# Patient Record
Sex: MALE | URBAN - METROPOLITAN AREA
[De-identification: names, ages, dates, MRNs, and addresses within clinical notes are randomized per-mention and may not be internally consistent; named-entity substitution may affect disease eponyms.]

---

## 2021-09-22 ENCOUNTER — INPATIENT (INPATIENT)
Facility: HOSPITAL | Age: 46
LOS: 2 days | Discharge: HOME | DRG: 53 | End: 2021-09-25
Attending: HOSPITALIST | Admitting: INTERNAL MEDICINE
Payer: MEDICAID

## 2021-09-22 VITALS
TEMPERATURE: 98 F | OXYGEN SATURATION: 100 % | SYSTOLIC BLOOD PRESSURE: 100 MMHG | RESPIRATION RATE: 18 BRPM | DIASTOLIC BLOOD PRESSURE: 56 MMHG | HEART RATE: 102 BPM

## 2021-09-22 DIAGNOSIS — Z78.9 OTHER SPECIFIED HEALTH STATUS: ICD-10-CM

## 2021-09-22 LAB
ALBUMIN SERPL ELPH-MCNC: 4.7 G/DL — SIGNIFICANT CHANGE UP (ref 3.3–5)
ALP SERPL-CCNC: 91 U/L — SIGNIFICANT CHANGE UP (ref 40–120)
ALT FLD-CCNC: 61 U/L — SIGNIFICANT CHANGE UP (ref 12–78)
ANION GAP SERPL CALC-SCNC: 7 MMOL/L — SIGNIFICANT CHANGE UP (ref 5–17)
APAP SERPL-MCNC: < 2 UG/ML — SIGNIFICANT CHANGE UP (ref 10–30)
AST SERPL-CCNC: 76 U/L — HIGH (ref 15–37)
BASOPHILS # BLD AUTO: 0.06 K/UL — SIGNIFICANT CHANGE UP (ref 0–0.2)
BASOPHILS NFR BLD AUTO: 0.6 % — SIGNIFICANT CHANGE UP (ref 0–2)
BILIRUB SERPL-MCNC: 0.9 MG/DL — SIGNIFICANT CHANGE UP (ref 0.2–1.2)
BUN SERPL-MCNC: 12 MG/DL — SIGNIFICANT CHANGE UP (ref 7–23)
CALCIUM SERPL-MCNC: 9.8 MG/DL — SIGNIFICANT CHANGE UP (ref 8.5–10.1)
CHLORIDE SERPL-SCNC: 97 MMOL/L — SIGNIFICANT CHANGE UP (ref 96–108)
CO2 SERPL-SCNC: 29 MMOL/L — SIGNIFICANT CHANGE UP (ref 22–31)
CREAT SERPL-MCNC: 0.78 MG/DL — SIGNIFICANT CHANGE UP (ref 0.5–1.3)
EOSINOPHIL # BLD AUTO: 0 K/UL — SIGNIFICANT CHANGE UP (ref 0–0.5)
EOSINOPHIL NFR BLD AUTO: 0 % — SIGNIFICANT CHANGE UP (ref 0–6)
GLUCOSE SERPL-MCNC: 130 MG/DL — HIGH (ref 70–99)
HCT VFR BLD CALC: 40.8 % — SIGNIFICANT CHANGE UP (ref 39–50)
HGB BLD-MCNC: 14 G/DL — SIGNIFICANT CHANGE UP (ref 13–17)
IMM GRANULOCYTES NFR BLD AUTO: 0.5 % — SIGNIFICANT CHANGE UP (ref 0–1.5)
LYMPHOCYTES # BLD AUTO: 0.31 K/UL — LOW (ref 1–3.3)
LYMPHOCYTES # BLD AUTO: 2.9 % — LOW (ref 13–44)
MAGNESIUM SERPL-MCNC: 1.7 MG/DL — SIGNIFICANT CHANGE UP (ref 1.6–2.6)
MCHC RBC-ENTMCNC: 33.1 PG — SIGNIFICANT CHANGE UP (ref 27–34)
MCHC RBC-ENTMCNC: 34.3 GM/DL — SIGNIFICANT CHANGE UP (ref 32–36)
MCV RBC AUTO: 96.5 FL — SIGNIFICANT CHANGE UP (ref 80–100)
MONOCYTES # BLD AUTO: 0.71 K/UL — SIGNIFICANT CHANGE UP (ref 0–0.9)
MONOCYTES NFR BLD AUTO: 6.6 % — SIGNIFICANT CHANGE UP (ref 2–14)
NEUTROPHILS # BLD AUTO: 9.67 K/UL — HIGH (ref 1.8–7.4)
NEUTROPHILS NFR BLD AUTO: 89.4 % — HIGH (ref 43–77)
PHOSPHATE SERPL-MCNC: 2.4 MG/DL — LOW (ref 2.5–4.5)
PLATELET # BLD AUTO: 101 K/UL — LOW (ref 150–400)
POTASSIUM SERPL-MCNC: 3.6 MMOL/L — SIGNIFICANT CHANGE UP (ref 3.5–5.3)
POTASSIUM SERPL-SCNC: 3.6 MMOL/L — SIGNIFICANT CHANGE UP (ref 3.5–5.3)
PROT SERPL-MCNC: 8.4 GM/DL — HIGH (ref 6–8.3)
RBC # BLD: 4.23 M/UL — SIGNIFICANT CHANGE UP (ref 4.2–5.8)
RBC # FLD: 14.7 % — HIGH (ref 10.3–14.5)
SALICYLATES SERPL-MCNC: <1.7 MG/DL — LOW (ref 2.8–20)
SODIUM SERPL-SCNC: 133 MMOL/L — LOW (ref 135–145)
WBC # BLD: 10.8 K/UL — HIGH (ref 3.8–10.5)
WBC # FLD AUTO: 10.8 K/UL — HIGH (ref 3.8–10.5)

## 2021-09-22 PROCEDURE — 86769 SARS-COV-2 COVID-19 ANTIBODY: CPT

## 2021-09-22 PROCEDURE — 83735 ASSAY OF MAGNESIUM: CPT

## 2021-09-22 PROCEDURE — 36415 COLL VENOUS BLD VENIPUNCTURE: CPT

## 2021-09-22 PROCEDURE — 95700 EEG CONT REC W/VID EEG TECH: CPT

## 2021-09-22 PROCEDURE — 84146 ASSAY OF PROLACTIN: CPT

## 2021-09-22 PROCEDURE — 72125 CT NECK SPINE W/O DYE: CPT | Mod: 26,MA

## 2021-09-22 PROCEDURE — 80076 HEPATIC FUNCTION PANEL: CPT

## 2021-09-22 PROCEDURE — 84100 ASSAY OF PHOSPHORUS: CPT

## 2021-09-22 PROCEDURE — 82140 ASSAY OF AMMONIA: CPT

## 2021-09-22 PROCEDURE — 71045 X-RAY EXAM CHEST 1 VIEW: CPT | Mod: 26

## 2021-09-22 PROCEDURE — 80048 BASIC METABOLIC PNL TOTAL CA: CPT

## 2021-09-22 PROCEDURE — 95816 EEG AWAKE AND DROWSY: CPT

## 2021-09-22 PROCEDURE — 70450 CT HEAD/BRAIN W/O DYE: CPT | Mod: 26,MA

## 2021-09-22 PROCEDURE — 99285 EMERGENCY DEPT VISIT HI MDM: CPT

## 2021-09-22 PROCEDURE — 95714 VEEG EA 12-26 HR UNMNTR: CPT

## 2021-09-22 PROCEDURE — 85025 COMPLETE CBC W/AUTO DIFF WBC: CPT

## 2021-09-22 PROCEDURE — 82550 ASSAY OF CK (CPK): CPT

## 2021-09-22 PROCEDURE — 80307 DRUG TEST PRSMV CHEM ANLYZR: CPT

## 2021-09-22 RX ORDER — SODIUM CHLORIDE 9 MG/ML
2000 INJECTION INTRAMUSCULAR; INTRAVENOUS; SUBCUTANEOUS ONCE
Refills: 0 | Status: COMPLETED | OUTPATIENT
Start: 2021-09-22 | End: 2021-09-22

## 2021-09-22 RX ORDER — THIAMINE MONONITRATE (VIT B1) 100 MG
100 TABLET ORAL ONCE
Refills: 0 | Status: COMPLETED | OUTPATIENT
Start: 2021-09-22 | End: 2021-09-22

## 2021-09-22 RX ADMIN — SODIUM CHLORIDE 2000 MILLILITER(S): 9 INJECTION INTRAMUSCULAR; INTRAVENOUS; SUBCUTANEOUS at 20:21

## 2021-09-22 RX ADMIN — Medication 2 MILLIGRAM(S): at 20:32

## 2021-09-22 RX ADMIN — SODIUM CHLORIDE 2000 MILLILITER(S): 9 INJECTION INTRAMUSCULAR; INTRAVENOUS; SUBCUTANEOUS at 19:00

## 2021-09-22 RX ADMIN — Medication 100 MILLIGRAM(S): at 20:33

## 2021-09-22 NOTE — ED PROVIDER NOTE - NS_ ATTENDINGSCRIBEDETAILS _ED_A_ED_FT
Adelaide Iverson MD: The history, relevant review of systems, past medical and surgical history, medical decision making, and physical examination was documented by the scribe in my presence and I attest to the accuracy of the documentation.

## 2021-09-22 NOTE — ED ADULT TRIAGE NOTE - CHIEF COMPLAINT QUOTE
Patient brought in by EMS for fall and possible seizure. Patient is nonverbal at baseline. Friends told EMS that patient fell to the ground and was shaking. calls attempted to former coworkers who did not have medical information and a number from patient's phone. unable to get additional information due to patient nonverbal.

## 2021-09-22 NOTE — ED PROVIDER NOTE - OBJECTIVE STATEMENT
46 year old male with no known PMHx presents to the ED brought in by EMS for fall and possible seizure. Patient is nonverbal at baseline. Friends told EMS that patient fell to the ground from a standing position and was shaking. Per EMS, calls attempted to former coworkers who did not have medical information. Pt did a drinking motion and nodded yes when asked if he had ingested vodka. Pt shook head no when asked if he feels pain anywhere. Unable to get additional information due to patient nonverbal

## 2021-09-22 NOTE — ED PROVIDER NOTE - CLINICAL SUMMARY MEDICAL DECISION MAKING FREE TEXT BOX
46 year old male with Neuro alert activated. 46 year old male presents with fall, questionable seizure-like activity. Neuro alert activated. Pt is tremulous here. Indicated he drank vodka. Will work up for alcohol withdrawal. Labs. Fluids Ativan. Reassess.

## 2021-09-23 LAB
ADD ON TEST-SPECIMEN IN LAB: SIGNIFICANT CHANGE UP
ALBUMIN SERPL ELPH-MCNC: 3.8 G/DL — SIGNIFICANT CHANGE UP (ref 3.3–5)
ALP SERPL-CCNC: 70 U/L — SIGNIFICANT CHANGE UP (ref 40–120)
ALT FLD-CCNC: 52 U/L — SIGNIFICANT CHANGE UP (ref 12–78)
AMMONIA BLD-MCNC: 63 UMOL/L — HIGH (ref 11–32)
ANION GAP SERPL CALC-SCNC: 7 MMOL/L — SIGNIFICANT CHANGE UP (ref 5–17)
AST SERPL-CCNC: 84 U/L — HIGH (ref 15–37)
BILIRUB DIRECT SERPL-MCNC: 0.3 MG/DL — HIGH (ref 0–0.2)
BILIRUB INDIRECT FLD-MCNC: 1.2 MG/DL — HIGH (ref 0.2–1)
BILIRUB SERPL-MCNC: 1.5 MG/DL — HIGH (ref 0.2–1.2)
BUN SERPL-MCNC: 8 MG/DL — SIGNIFICANT CHANGE UP (ref 7–23)
CALCIUM SERPL-MCNC: 9 MG/DL — SIGNIFICANT CHANGE UP (ref 8.5–10.1)
CHLORIDE SERPL-SCNC: 101 MMOL/L — SIGNIFICANT CHANGE UP (ref 96–108)
CK SERPL-CCNC: 1455 U/L — HIGH (ref 26–308)
CO2 SERPL-SCNC: 28 MMOL/L — SIGNIFICANT CHANGE UP (ref 22–31)
COVID-19 SPIKE DOMAIN AB INTERP: POSITIVE
COVID-19 SPIKE DOMAIN ANTIBODY RESULT: >250 U/ML — HIGH
CREAT SERPL-MCNC: 0.54 MG/DL — SIGNIFICANT CHANGE UP (ref 0.5–1.3)
GLUCOSE SERPL-MCNC: 76 MG/DL — SIGNIFICANT CHANGE UP (ref 70–99)
MAGNESIUM SERPL-MCNC: 2.1 MG/DL — SIGNIFICANT CHANGE UP (ref 1.6–2.6)
PCP SPEC-MCNC: SIGNIFICANT CHANGE UP
PHOSPHATE SERPL-MCNC: 2.8 MG/DL — SIGNIFICANT CHANGE UP (ref 2.5–4.5)
POTASSIUM SERPL-MCNC: 3.6 MMOL/L — SIGNIFICANT CHANGE UP (ref 3.5–5.3)
POTASSIUM SERPL-SCNC: 3.6 MMOL/L — SIGNIFICANT CHANGE UP (ref 3.5–5.3)
PROT SERPL-MCNC: 7.5 GM/DL — SIGNIFICANT CHANGE UP (ref 6–8.3)
SARS-COV-2 IGG+IGM SERPL QL IA: >250 U/ML — HIGH
SARS-COV-2 IGG+IGM SERPL QL IA: POSITIVE
SODIUM SERPL-SCNC: 136 MMOL/L — SIGNIFICANT CHANGE UP (ref 135–145)

## 2021-09-23 PROCEDURE — 99223 1ST HOSP IP/OBS HIGH 75: CPT

## 2021-09-23 PROCEDURE — 12345: CPT | Mod: NC

## 2021-09-23 PROCEDURE — 95816 EEG AWAKE AND DROWSY: CPT | Mod: 26

## 2021-09-23 RX ORDER — SODIUM CHLORIDE 9 MG/ML
1000 INJECTION, SOLUTION INTRAVENOUS
Refills: 0 | Status: DISCONTINUED | OUTPATIENT
Start: 2021-09-23 | End: 2021-09-23

## 2021-09-23 RX ORDER — FOLIC ACID 0.8 MG
1 TABLET ORAL DAILY
Refills: 0 | Status: DISCONTINUED | OUTPATIENT
Start: 2021-09-23 | End: 2021-09-25

## 2021-09-23 RX ORDER — SODIUM CHLORIDE 9 MG/ML
1000 INJECTION, SOLUTION INTRAVENOUS
Refills: 0 | Status: DISCONTINUED | OUTPATIENT
Start: 2021-09-23 | End: 2021-09-25

## 2021-09-23 RX ORDER — SODIUM CHLORIDE 9 MG/ML
1000 INJECTION INTRAMUSCULAR; INTRAVENOUS; SUBCUTANEOUS
Refills: 0 | Status: DISCONTINUED | OUTPATIENT
Start: 2021-09-23 | End: 2021-09-23

## 2021-09-23 RX ORDER — THIAMINE MONONITRATE (VIT B1) 100 MG
100 TABLET ORAL DAILY
Refills: 0 | Status: COMPLETED | OUTPATIENT
Start: 2021-09-23 | End: 2021-09-25

## 2021-09-23 RX ADMIN — Medication 2 MILLIGRAM(S): at 13:34

## 2021-09-23 RX ADMIN — Medication 2 MILLIGRAM(S): at 05:18

## 2021-09-23 RX ADMIN — Medication 1 TABLET(S): at 10:59

## 2021-09-23 RX ADMIN — Medication 100 MILLIGRAM(S): at 10:59

## 2021-09-23 RX ADMIN — Medication 2 MILLIGRAM(S): at 11:01

## 2021-09-23 RX ADMIN — Medication 1 MILLIGRAM(S): at 10:59

## 2021-09-23 RX ADMIN — SODIUM CHLORIDE 75 MILLILITER(S): 9 INJECTION, SOLUTION INTRAVENOUS at 16:31

## 2021-09-23 RX ADMIN — Medication 2 MILLIGRAM(S): at 22:34

## 2021-09-23 RX ADMIN — Medication 2 MILLIGRAM(S): at 19:02

## 2021-09-23 RX ADMIN — Medication 2 MILLIGRAM(S): at 17:33

## 2021-09-23 RX ADMIN — Medication 2 MILLIGRAM(S): at 00:35

## 2021-09-23 NOTE — PROVIDER CONTACT NOTE (EICU) - BACKGROUND
45 y/o male with unknown PMH with seizure like activity witnessed by friends and EMS. In ER pt following all commands although not verbal/not speaking.

## 2021-09-23 NOTE — PHARMACOTHERAPY INTERVENTION NOTE - COMMENTS
med history complete, patient currently unable to provide medication history due to mental status, per patients doctor first medhx no medication filled

## 2021-09-23 NOTE — CONSULT NOTE ADULT - ASSESSMENT
45 yo m unknown pmhx admitted with     1. ETOH use  2. Seizures    Recommendations:   -suggest ativan taper, can consider higher taper if needed.   -suggest thiamine, folic acid and mv  -suggest neuro consult  -suggest psych consult   -unsure if patient nonverbal because of a language barrier, ?underlying psych disorder, ?etoh withdrawal.     Patient seen at bedside, vitals/chart/medications reviewed, case discussed with eICU attending Dr. Oleary.  At this time patient does not require ICU admission, recommendations as above. 
46 year old man brought to ED by friends for fall and seizure like activity.  The patient is unable to provide any history and there is no contact information in the chart for any friends or family.  He is non-verbal and there is limited participation in the neurological examination, but no gross focal deficits are noted.  Utox and ETOH levels are unremarkable.  CT head with some sinus inflammation, otherwise no acute pathology.  Will get routine EEG in ED. When admitted to floors can consider 24 hour EEG if unable to obtain more information.  Patient tremulous, unclear if there is withdrawal.  Attempt to obtain more information regarding patient's baseline.  Will follow.
large, pale polyp extending from endocervix, cavity otherwise WNL, uterus mildly enlarged

## 2021-09-23 NOTE — H&P ADULT - NSHPPHYSICALEXAM_GEN_ALL_CORE
Vital Signs Last 24 Hrs  T(C): 37.3 (22 Sep 2021 22:31), Max: 37.3 (22 Sep 2021 22:31)  T(F): 99.1 (22 Sep 2021 22:31), Max: 99.1 (22 Sep 2021 22:31)  HR: 79 (22 Sep 2021 22:31) (79 - 102)  BP: 124/94 (22 Sep 2021 22:31) (100/56 - 124/94)  RR: 18 (22 Sep 2021 22:31) (18 - 18)  SpO2: 100% (22 Sep 2021 22:31) (100% - 100%)

## 2021-09-23 NOTE — H&P ADULT - ASSESSMENT
47 y/o M with no known PMHx was BIBA s/p fall with seizure-like activity witnessed by friends. The patient's "friends" told EMS that the patient reportedly fell to the ground from a standing position and was "shaking". Per EMS, calls were attempted to former coworkers who did not have medical information. Per ED documentation the patient reportedly "did a drinking motion and nodded yes when asked if he had ingested vodka." As noted patient was notably tremulous in the ED and non-verbal. HPI therefore limited due to the patient's diminished mental status.    #Seizures  ~admit to Medicine  ~f/u w/ Neurology in the am  ~patient may likely be in a post-ictal state  ~EEG in the am  ~cont Ativan per CIWA protocol  ~DDx includes withdrawal seizures  ~f/u prolactin  ~f/u CPK    #Alcohol Abuse  ~will initiate CIWA protocol  ~cont. Folate/Thiamine/MVI  ~cont. IV hydration    #Vte ppx  ~IMPROVE Vte Risk Scores 0  ~cont. SCDs for now

## 2021-09-23 NOTE — H&P ADULT - HISTORY OF PRESENT ILLNESS
47 y/o M with no known PMHx was BIBA s/p fall with seizure-like activity witnessed by friends. The patient's "friends" told EMS that the patient reportedly fell to the ground from a standing position and was "shaking". Per EMS, calls were attempted to former coworkers who did not have medical information. Per ED documentation the patient reportedly "did a drinking motion and nodded yes when asked if he had ingested vodka." As noted patient was notably tremulous in the ED and non-verbal. HPI therefore limited due to the patient's diminished mental status.            PMHx/PSHx/FamilyHx/SocialHx; unobtainable due to the patients current medical condition

## 2021-09-23 NOTE — CONSULT NOTE ADULT - SUBJECTIVE AND OBJECTIVE BOX
Patient is a 46y old  Male who presents with a chief complaint of Seizure (23 Sep 2021 05:03)      HPI:  47 y/o M with no known PMHx was BIBA s/p fall with seizure-like activity witnessed by friends.  The patient is not verbalizing at all and cannot provide any history.  The patient's "friends" told EMS that the patient reportedly fell to the ground from a standing position and was "shaking". Per EMS, calls were attempted to former coworkers who did not have medical information. Per ED documentation the patient reportedly "did a drinking motion and nodded yes when asked if he had ingested vodka." As noted patient was notably tremulous in the ED and non-verbal. HPI therefore limited due to the patient's diminished mental status.        PMHx/PSHx/FamilyHx/SocialHx; unobtainable due to the patients current medical condition   (23 Sep 2021 03:31)      PAST MEDICAL & SURGICAL HISTORY:  Unable to obtain    FAMILY HISTORY:  Unable to obtain    Social Hx:  Unknown    MEDICATIONS  (STANDING):  folic acid 1 milliGRAM(s) Oral daily  lactated ringers. 1000 milliLiter(s) (100 mL/Hr) IV Continuous <Continuous>  LORazepam   Injectable   IV Push   LORazepam   Injectable 2 milliGRAM(s) IV Push every 4 hours  multivitamin 1 Tablet(s) Oral daily  thiamine 100 milliGRAM(s) Oral daily       Allergies    No Known Allergies    Intolerances        ROS: Pertinent positives in HPI, all other ROS were reviewed and are negative.      Vital Signs Last 24 Hrs  T(C): 37 (23 Sep 2021 07:00), Max: 37.3 (22 Sep 2021 22:31)  T(F): 98.6 (23 Sep 2021 07:00), Max: 99.1 (22 Sep 2021 22:31)  HR: 75 (23 Sep 2021 07:00) (75 - 102)  BP: 120/66 (23 Sep 2021 07:00) (100/56 - 131/78)  BP(mean): --  RR: 17 (23 Sep 2021 07:00) (17 - 18)  SpO2: 98% (23 Sep 2021 07:00) (98% - 100%)        Constitutional: awake and alert.  HEENT: PERRLA, EOMI,   Neck: Supple. No nuchal rigidity  Respiratory: Breath sounds are clear bilaterally  Cardiovascular: S1 and S2, regular / irregular rhythm  Gastrointestinal: soft, nontender  Extremities:  no edema  Musculoskeletal: no joint swelling/tenderness, no abnormal movements  Skin: No rashes    Neurological exam:  HF: Awake and alert. Makes eye contact. Nods yes intermittently. Follows some commands (not consistently). Not verbalizing  CN: ORION, EOMI, VFF, facial sensation normal, no NLFD, tongue midline, Palate moves equally, SCM equal bilaterally  Motor: Normal tone. Moves all extremities equally  Sens: withdraws throughout. Sensation appears intact  Reflexes: Symmetric and normal . BJ 2+, BR 2+, KJ 2+, AJ 2+, downgoing toes b/l  Coord:  Mild tremors of b/l extremities with extension of arms.   Gait/Balance: Not tested          Labs:   09-22    133<L>  |  97  |  12  ----------------------------<  130<H>  3.6   |  29  |  0.78    Ca    9.8      22 Sep 2021 18:57  Phos  2.4     09-22  Mg     1.7     09-22    TPro  8.4<H>  /  Alb  4.7  /  TBili  0.9  /  DBili  x   /  AST  76<H>  /  ALT  61  /  AlkPhos  91  09-22                              14.0   10.80 )-----------( 101      ( 22 Sep 2021 18:57 )             40.8       Radiology:  CT head 9/22/21:  1)  unremarkable CT study of the brain. No acute infarct or hemorrhagic lesion noted..  2)  inflammatory changes noted in the sinuses predominantly left-sided. Sphenoid mucosal disease and pansinus thickening also noted.    CT cervical spine 9/22/21:  No acute fracture identified.  
47 yo m unknown pmhx biba s/p seizure like activity in front of friends and once with EMS admitted to medicine with etoh withdrawal and seizures.  Head ct negative, labs grossly unremarkable.  ICU consult placed for concern for DTs.  At rest when sleeping patient appears comfortable, not tremulous.  When woken up becomes tremulous, appears nervous and is nonverbal.  Patient seen at bedside, will follow commands ie wiggles toes when pointed at them, shows 2 fingers after I demonstrate etc however will not speak.  Unknown if there is a language barrier, ?possible underlying psych component.        PAST MEDICAL & SURGICAL HISTORY:  Unknown      Allergies  No Known Allergies      FAMILY HISTORY:      Social History:       Review of Systems:  Unable to obtain 2/2 currently nonverbal      Physical Examination:    General: Adult male, lying in bed, appears nervous    HEENT: NC/AT, pupils 3mm, equal, reactive, symmetrical to light.     PULM: Symmetrical thorax expansion upon respiration.  Clear to auscultation bilaterally, no significant sputum production    CVS: Regular rate and rhythm, no murmurs, rubs, or gallops    ABD: Soft, nondistended, nontender, normoactive bowel sounds, no masses    EXT: No edema, nontender    SKIN: Warm and well perfused, no rashes noted.    NEURO: Alert, follows basic commands, nonverbal      Medications:  LORazepam     Tablet 2 milliGRAM(s) Oral every 2 hours PRN  LORazepam   Injectable 2 milliGRAM(s) IV Push every 1 hour PRN  LORazepam   Injectable 2 milliGRAM(s) IV Push once PRN  LORazepam   Injectable   IV Push   LORazepam   Injectable 2 milliGRAM(s) IV Push every 4 hours  folic acid 1 milliGRAM(s) Oral daily  multivitamin 1 Tablet(s) Oral daily  sodium chloride 0.9%. 1000 milliLiter(s) IV Continuous <Continuous>  thiamine 100 milliGRAM(s) Oral daily      ICU Vital Signs Last 24 Hrs  T(C): 37.3 (22 Sep 2021 22:31), Max: 37.3 (22 Sep 2021 22:31)  T(F): 99.1 (22 Sep 2021 22:31), Max: 99.1 (22 Sep 2021 22:31)  HR: 79 (22 Sep 2021 22:31) (79 - 102)  BP: 124/94 (22 Sep 2021 22:31) (100/56 - 124/94)  BP(mean): --  ABP: --  ABP(mean): --  RR: 18 (22 Sep 2021 22:31) (18 - 18)  SpO2: 100% (22 Sep 2021 22:31) (100% - 100%)    Vital Signs Last 24 Hrs  T(C): 37.3 (22 Sep 2021 22:31), Max: 37.3 (22 Sep 2021 22:31)  T(F): 99.1 (22 Sep 2021 22:31), Max: 99.1 (22 Sep 2021 22:31)  HR: 79 (22 Sep 2021 22:31) (79 - 102)  BP: 124/94 (22 Sep 2021 22:31) (100/56 - 124/94)  BP(mean): --  RR: 18 (22 Sep 2021 22:31) (18 - 18)  SpO2: 100% (22 Sep 2021 22:31) (100% - 100%)      LABS:                        14.0   10.80 )-----------( 101      ( 22 Sep 2021 18:57 )             40.8     09-22    133<L>  |  97  |  12  ----------------------------<  130<H>  3.6   |  29  |  0.78    Ca    9.8      22 Sep 2021 18:57  Phos  2.4     09-22  Mg     1.7     09-22    TPro  8.4<H>  /  Alb  4.7  /  TBili  0.9  /  DBili  x   /  AST  76<H>  /  ALT  61  /  AlkPhos  91  09-22      CARDIAC MARKERS ( 22 Sep 2021 18:57 )  x     / x     / 539 U/L / x     / x          RADIOLOGY:   < from: CT Cervical Spine No Cont (09.22.21 @ 19:02) >  EXAM:  CT CERVICAL SPINE                          PROCEDURE DATE:  09/22/2021      INTERPRETATION:  CT CERVICAL    INDICATIONS:  neck pain. Trauma.    TECHNIQUE:  Thin section CT imaging was conducted.  3-D, Coronal and sagittal reformations were generated from the axial data.    FINDINGS:    There is alteration of the cervical lordosis which may reflect positioning or spasm.    C1/C2:  The anterior and posterior arches of C1 appear to be intact. There is no C1-C2 subluxation. No odontoidfracture is noted. Base of C2 appears to be intact    The mid and lower cervical vertebral bodies appear to be intact. No upper thoracic fracture is noted.    Mild degenerative changes are noted in the mid and lower cervical region without significant stenosis    Lung apices are clear with no gross pneumothorax.    IMPRESSION:    No acute fracture identified.    --- End of Report ---    DEBORAH LOMELI MD; Attending Radiologist  This document has been electronically signed. Sep 22 2021  6:58PM    < end of copied text >      SUPPLEMENTAL O2: RA  LINES: Peripheral   IVF: NS  GONZALEZ: N   PPx:   CONTACT:

## 2021-09-23 NOTE — PROVIDER CONTACT NOTE (EICU) - RECOMMENDATIONS
Agree with ativan po taper to prevent DTs  MVI/thiamine/folate  Neurology consult  Pt may be admitted to the medical floor.  Discussed with ICU PA.

## 2021-09-23 NOTE — PROGRESS NOTE ADULT - ASSESSMENT
46 year old man brought to ED by friends for fall and seizure like activity. The patient is unable to provide any history. Unable to reach family for collateral history. ? Daughter [phone]. He is non-verbal and there is limited participation in the neurological examination, but no gross focal deficits are noted. Utox and ETOH levels are unremarkable. CT head with some sinus inflammation, otherwise no acute pathology. EEG normal study in the awake and drowsy states with the exception of excess beta activity. Excess beta activity is likely a medication effect which can be seen with benzodiazepine and barbiturate use. No epileptiform activity was seen and no clinical events or seizures were recorded.     Possible seizure, acute encephalopathy, delirium  Etiology unclear. Possibly was an alcohol withdrawal seizure. ETOH level neg. Utox neg. On Ativan for tremulousness, confusion, psychomotor agitation. Will continue to try to reach out to family for collatoral history. Thiamine, Folate, MVI, IVFs with D5 1/2 NS.

## 2021-09-24 VITALS
SYSTOLIC BLOOD PRESSURE: 120 MMHG | OXYGEN SATURATION: 98 % | RESPIRATION RATE: 18 BRPM | TEMPERATURE: 98 F | DIASTOLIC BLOOD PRESSURE: 84 MMHG | HEART RATE: 78 BPM

## 2021-09-24 LAB
ANION GAP SERPL CALC-SCNC: 5 MMOL/L — SIGNIFICANT CHANGE UP (ref 5–17)
BASOPHILS # BLD AUTO: 0.03 K/UL — SIGNIFICANT CHANGE UP (ref 0–0.2)
BASOPHILS NFR BLD AUTO: 0.8 % — SIGNIFICANT CHANGE UP (ref 0–2)
BUN SERPL-MCNC: 8 MG/DL — SIGNIFICANT CHANGE UP (ref 7–23)
CALCIUM SERPL-MCNC: 9.1 MG/DL — SIGNIFICANT CHANGE UP (ref 8.5–10.1)
CHLORIDE SERPL-SCNC: 104 MMOL/L — SIGNIFICANT CHANGE UP (ref 96–108)
CO2 SERPL-SCNC: 30 MMOL/L — SIGNIFICANT CHANGE UP (ref 22–31)
CREAT SERPL-MCNC: 0.63 MG/DL — SIGNIFICANT CHANGE UP (ref 0.5–1.3)
EOSINOPHIL # BLD AUTO: 0.13 K/UL — SIGNIFICANT CHANGE UP (ref 0–0.5)
EOSINOPHIL NFR BLD AUTO: 3.5 % — SIGNIFICANT CHANGE UP (ref 0–6)
GLUCOSE SERPL-MCNC: 109 MG/DL — HIGH (ref 70–99)
HCT VFR BLD CALC: 41.9 % — SIGNIFICANT CHANGE UP (ref 39–50)
HGB BLD-MCNC: 14.2 G/DL — SIGNIFICANT CHANGE UP (ref 13–17)
IMM GRANULOCYTES NFR BLD AUTO: 0.3 % — SIGNIFICANT CHANGE UP (ref 0–1.5)
LYMPHOCYTES # BLD AUTO: 0.94 K/UL — LOW (ref 1–3.3)
LYMPHOCYTES # BLD AUTO: 25.6 % — SIGNIFICANT CHANGE UP (ref 13–44)
MAGNESIUM SERPL-MCNC: 2.1 MG/DL — SIGNIFICANT CHANGE UP (ref 1.6–2.6)
MANUAL SMEAR VERIFICATION: SIGNIFICANT CHANGE UP
MCHC RBC-ENTMCNC: 33.4 PG — SIGNIFICANT CHANGE UP (ref 27–34)
MCHC RBC-ENTMCNC: 33.9 GM/DL — SIGNIFICANT CHANGE UP (ref 32–36)
MCV RBC AUTO: 98.6 FL — SIGNIFICANT CHANGE UP (ref 80–100)
MONOCYTES # BLD AUTO: 0.53 K/UL — SIGNIFICANT CHANGE UP (ref 0–0.9)
MONOCYTES NFR BLD AUTO: 14.4 % — HIGH (ref 2–14)
NEUTROPHILS # BLD AUTO: 2.03 K/UL — SIGNIFICANT CHANGE UP (ref 1.8–7.4)
NEUTROPHILS NFR BLD AUTO: 55.4 % — SIGNIFICANT CHANGE UP (ref 43–77)
PHOSPHATE SERPL-MCNC: 2.9 MG/DL — SIGNIFICANT CHANGE UP (ref 2.5–4.5)
PLAT MORPH BLD: NORMAL — SIGNIFICANT CHANGE UP
PLATELET # BLD AUTO: 90 K/UL — LOW (ref 150–400)
POTASSIUM SERPL-MCNC: 3.2 MMOL/L — LOW (ref 3.5–5.3)
POTASSIUM SERPL-SCNC: 3.2 MMOL/L — LOW (ref 3.5–5.3)
PROLACTIN SERPL-MCNC: 8.6 NG/ML — SIGNIFICANT CHANGE UP (ref 4.1–18.4)
RBC # BLD: 4.25 M/UL — SIGNIFICANT CHANGE UP (ref 4.2–5.8)
RBC # FLD: 14.4 % — SIGNIFICANT CHANGE UP (ref 10.3–14.5)
RBC BLD AUTO: NORMAL — SIGNIFICANT CHANGE UP
SODIUM SERPL-SCNC: 139 MMOL/L — SIGNIFICANT CHANGE UP (ref 135–145)
WBC # BLD: 3.67 K/UL — LOW (ref 3.8–10.5)
WBC # FLD AUTO: 3.67 K/UL — LOW (ref 3.8–10.5)

## 2021-09-24 PROCEDURE — 99233 SBSQ HOSP IP/OBS HIGH 50: CPT

## 2021-09-24 PROCEDURE — 95720 EEG PHY/QHP EA INCR W/VEEG: CPT

## 2021-09-24 PROCEDURE — 99232 SBSQ HOSP IP/OBS MODERATE 35: CPT

## 2021-09-24 RX ORDER — POTASSIUM CHLORIDE 20 MEQ
40 PACKET (EA) ORAL ONCE
Refills: 0 | Status: COMPLETED | OUTPATIENT
Start: 2021-09-24 | End: 2021-09-24

## 2021-09-24 RX ADMIN — Medication 1.5 MILLIGRAM(S): at 02:00

## 2021-09-24 RX ADMIN — SODIUM CHLORIDE 75 MILLILITER(S): 9 INJECTION, SOLUTION INTRAVENOUS at 15:34

## 2021-09-24 RX ADMIN — Medication 1 TABLET(S): at 10:59

## 2021-09-24 RX ADMIN — Medication 100 MILLIGRAM(S): at 10:59

## 2021-09-24 RX ADMIN — Medication 1.5 MILLIGRAM(S): at 11:00

## 2021-09-24 RX ADMIN — SODIUM CHLORIDE 75 MILLILITER(S): 9 INJECTION, SOLUTION INTRAVENOUS at 02:00

## 2021-09-24 RX ADMIN — Medication 40 MILLIEQUIVALENT(S): at 15:53

## 2021-09-24 RX ADMIN — Medication 1.5 MILLIGRAM(S): at 05:41

## 2021-09-24 RX ADMIN — Medication 1 MILLIGRAM(S): at 10:59

## 2021-09-24 NOTE — PROGRESS NOTE ADULT - ASSESSMENT
46 year old man brought to ED by friends for fall and seizure like activity.  Limited history available.  I attempted to contact "Lenin" ([phone]) who's number is in his phone but there was no answer.    24 hour EEG does not show evidence of epileptiform activity.  Unable to obtain any history regarding the event that brought him here (patient nods head "no" when asked if he drinks alcohol) or any underlying risk for seizures.  At this time would hold off on anticonvulsants.  If information is obtained suggesting underlying risk for seizures, can start Keppra.    Dr. Langley will take over neurology service on 9/25/21. Please call if needed.

## 2021-09-24 NOTE — PROGRESS NOTE ADULT - ASSESSMENT
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 The was unable to provide any history and friends and family were not able to be reached for collateral. In the ED, patient was unable to communicate. He was a bit drowsy as well, possibly post ictal. He had no focal deficits on neuro exam. Labs were notable for mild leukocytosis, possibly reactive, Na 133, K WNL, glucose 130, BUN, Cr WNL. LFTs WNL aside from mild AST/ALT elevation. CPK 1455. Ammonia 63. Urine drug screen negative. Acetaminophen < 2.0. Salicylate < 1.7. Alcohol < 10. CT head was negative aside from some mild sinus inflammation. Neurology was consulted and patient was admitted to Medicine.     Possible seizure, acute encephalopathy, delirium  Delirium appears to be resolving or resolved. Today, 9/24, spoke with patient's uncle who provided collateral history that patient has had deafness and mutism since birth, otherwise has no medical conditions that they know of and is on no medications. He follows with PCP Dr. Ramirez in [place]. He is unemployed and financially supported by family. He drinks alcohol occasionally and not to a pathologic degree per uncle, though patient's thrombocytopenia and mild AST/ALT elevation may suggest otherwise. No known drug use. Patient overall appears well today and is likely at his baseline. He is able to converse minimally with head nods, writes a bit on paper, can hear a very little bit and read lips minimally. He does not appear to have any active symptoms. No further seizure activity since admission. CT head negative. EEG negative for seizure activity. Appreciate input from Neurology. For MRI brain WWO today to complete workup. Anticipating patient discharge tomorrow to home, uncle to pick him up at the hospital. Continue empiric thiamine, folic acid, MVI.     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 The was unable to provide any history and friends and family were not able to be reached for collateral. In the ED, patient was unable to communicate. He was a bit drowsy as well, possibly post ictal. He had no focal deficits on neuro exam. Labs were notable for mild leukocytosis, possibly reactive, Na 133, K WNL, glucose 130, BUN, Cr WNL. LFTs WNL aside from mild AST/ALT elevation. CPK 1455. Ammonia 63. Urine drug screen negative. Acetaminophen < 2.0. Salicylate < 1.7. Alcohol < 10. CT head was negative aside from some mild sinus inflammation. Neurology was consulted and patient was admitted to Medicine.     Likely seizure, acute encephalopathy, delirium  Delirium and encephalopathy appear to be resolving or resolved. Today, 9/24, spoke with patient's uncle who provided collateral history that patient has had deafness and mutism since birth, otherwise has no medical conditions that they know of and is on no medications. He follows with PCP Dr. Ramirez in [place]. He is unemployed and financially supported by family. He drinks alcohol occasionally and not to a pathologic degree per uncle, though patient's thrombocytopenia and mild AST/ALT elevation may suggest otherwise. No known drug use. Patient overall appears well today and is likely at his baseline. He is able to converse minimally with head nods, writes a bit on paper, can hear a very little bit and read lips minimally. He does not appear to have any active symptoms. No further seizure activity since admission. CT head negative. EEG negative for seizure activity. Appreciate input from Neurology. For MRI brain WWO today to complete workup. Anticipating patient discharge tomorrow to home, uncle to pick him up at the hospital.     Possible alcohol use disorder  As evidenced by his mild thrombocytopenia, mild AST/ALT elevation. And presentation for likely seizure. Continue empiric thiamine, folic acid, MVI.     Diet: Regular  DVT px: Ambulatory  Code status: Full  Emergency contact: Vu (uncle) [phone]  Dispo: Anticipate DC home tomorrow with patient's uncle. Outpatient follow up with PCP Dr. J Carlos Ramirez, [address], [facility] [address]. [phone].

## 2021-09-24 NOTE — PROGRESS NOTE ADULT - SUBJECTIVE AND OBJECTIVE BOX
Chief complaint: Seizure    Interval history: Subjective assessment limited by patient's mental status, unable to verbalize complaints though following some simple commands. Has intermittently received Ativan for perceived alcohol withdrawal. Unable to reach family for collateral history. Patient remains seemingly confused, bit impulsive, tremulous.     ROS: Multisystem review is very limited by his delirium    Vitals:  T(F): 98.4 (23 Sep 2021 17:29), Max: 99.1 (22 Sep 2021 22:31)  HR: 89 (23 Sep 2021 17:29) (59 - 89)  BP: 116/85 (23 Sep 2021 17:29) (116/85 - 131/78)  BP(mean): 84 (23 Sep 2021 10:22) (84 - 84)  RR: 18 (23 Sep 2021 17:29) (17 - 18)  SpO2: 97% (23 Sep 2021 17:29) (96% - 100%)    Exam:  Gen: No acute distress  HEENT: NCAT PERRL moist oral mucosa  Neck: Supple, no JVD  Chest: CTA B/L  CVS: S1 S2 normal RRR  Abd: +BS, soft NT ND   Ext: No edema or calf tenderness  Neuro: Awake, confused, not verbalizing, unclear if post ictal, delirious etc, no focal deficits though    Labs:                      14.0   10.80 )-----------( 101                   40.8     136  |  101  |  8   ----------------------------<  76  3.6   |  28  |  0.54    Ca    9.0        Phos  2.8       Mg     2.1         TPro  7.5  /  Alb  3.8  /  TBili  1.5<H>  /  DBili  0.3<H>  /  AST  84<H>  /  ALT  52  /  AlkPhos  70  09-23    CPK 1455  Ammonia 63    Imaging:  CT head WO 9/22: Unremarkable CT study of the brain. No acute infarct or hemorrhagic lesion noted. Inflammatory changes noted in the sinuses predominantly left-sided. Sphenoid mucosal disease and pansinus thickening also noted.    CT C spine WO 9/22: There is alteration of the cervical lordosis which may reflect positioning or spasm. The anterior and posterior arches of C1 appear to be intact. There is no C1-C2 subluxation. No odontoid fracture is noted. Base of C2 appears to be intact. The mid and lower cervical vertebral bodies appear to be intact. No upper thoracic fracture is noted. Mild degenerative changes are noted in the mid and lower cervical region without significant stenosis    Micro:  COVIDPCR 9/22: Negative    Additional Testing:  EEG 9/23: Normal EEG study in the awake and drowsy states with the exception of excess beta activity. Excess beta activity is likely a medication effect which can be seen with benzodiazepine and barbiturate use. No epileptiform activity was seen and no clinical events or seizures were recorded.                                   
Chief complaint: Seizure    Interval history: Subjective assessment limited by patient's deafness and mutism. Patient overall appears well. He is able to converse minimally with head nods, writes a bit on paper, can hear a very little bit and read lips minimally. He does not appear to have any active symptoms. No further seizure activity since admission. Spoke with patient's uncle Vu, with whom patient lives and who takes are of patient. Patient has had this condition since birth but otherwise is not known to have any medical conditions and is not on any medications. He is not employed. He is financially supported by family. He drinks alcohol occasionally and not to a pathologic degree per uncle, though patient's thrombocytopenia and mild AST/ALT elevation may suggest otherwise. No known drug use. Note EEG negative for seizure activity. Appreciate input from Neurology. For MRI brain WWO today to complete workup. Anticipating patient discharge tomorrow to home, uncle to pick him up at the hospital.     ROS: Multisystem review is limited by patient's deafness and mutism but appears otherwise comprehensively negative x 10 systems    Vitals:  T(C): 36.8 (24 Sep 2021 08:08), Max: 37.2 (23 Sep 2021 15:41)  T(F): 98.3 (24 Sep 2021 08:08), Max: 98.9 (23 Sep 2021 15:41)  HR: 98 (24 Sep 2021 08:08) (73 - 98)  BP: 122/91 (24 Sep 2021 08:08) (116/85 - 127/95)  RR: 19 (24 Sep 2021 08:08) (18 - 19)  SpO2: 100% (24 Sep 2021 08:08) (96% - 100%)    Exam:  Gen: No acute distress  HEENT: NCAT PERRL moist oral mucosa  Neck: Supple, no JVD  Chest: CTA B/L  CVS: S1 S2 normal RRR  Abd: +BS, soft NT ND   Ext: No edema or calf tenderness  Neuro: Awake, alert. Follows simple commands, deaf, mute. Strength full 5/5 throughout. Sensation intact. No drift. Plantars downgoing. Able to heal to shin well. Steady gait.     Labs:                      14.2   3.67 )-----------( 90                   41.9     139  |  104  |  8   ----------------------------<  109  3.2   |  30  |  0.63    Ca    9.1        Phos  2.9       Mg     2.1         TPro  7.2  /  Alb  3.7  /  TBili  1.3<H>  /  DBili  0.3<H>  /  AST  68<H>  /  ALT  47  /  AlkPhos  71    CPK 1455 --> 876  Ammonia 63  Urine drug screen negative  Acetaminophen < 2.0  Salicylate < 1.7  Alcohol < 10    Imaging:  MRI head WWO requested    CT head WO 9/22: Unremarkable CT study of the brain. No acute infarct or hemorrhagic lesion noted. Inflammatory changes noted in the sinuses predominantly left-sided. Sphenoid mucosal disease and pansinus thickening also noted.    CT C spine WO 9/22: There is alteration of the cervical lordosis which may reflect positioning or spasm. The anterior and posterior arches of C1 appear to be intact. There is no C1-C2 subluxation. No odontoid fracture is noted. Base of C2 appears to be intact. The mid and lower cervical vertebral bodies appear to be intact. No upper thoracic fracture is noted. Mild degenerative changes are noted in the mid and lower cervical region without significant stenosis    Micro:  COVID PCR 9/22: Negative    Additional Testing:  EEG 9/24: Unmonitored 12-26 hour EEG done. Normal EEG in the awake, drowsy and asleep states with the exception of excess beta activity. No epileptiform activity is seen. Excess beta activity is likely a medication effect which can be seen in the setting of benzodiazepine and barbiturate use    EEG 9/23: Normal EEG study in the awake and drowsy states with the exception of excess beta activity. Excess beta activity is likely a medication effect which can be seen with benzodiazepine and barbiturate use. No epileptiform activity was seen and no clinical events or seizures were recorded.                         
Interval History:  9/24/21: Patient nonverbal but able to nod "yes" and "no" and use gestures.    MEDICATIONS  (STANDING):  dextrose 5% + sodium chloride 0.45%. 1000 milliLiter(s) (75 mL/Hr) IV Continuous <Continuous>  folic acid 1 milliGRAM(s) Oral daily  LORazepam   Injectable   IV Push   LORazepam   Injectable 1.5 milliGRAM(s) IV Push every 4 hours  multivitamin 1 Tablet(s) Oral daily  potassium chloride   Powder 40 milliEquivalent(s) Oral once  thiamine 100 milliGRAM(s) Oral daily    MEDICATIONS  (PRN):  LORazepam     Tablet 2 milliGRAM(s) Oral every 2 hours PRN Symptom-triggered 2 point increase in CIWA-Ar  LORazepam   Injectable 2 milliGRAM(s) IV Push every 1 hour PRN Symptom-triggered: each CIWA -Ar score 8 or GREATER  LORazepam   Injectable 2 milliGRAM(s) IV Push once PRN To control seizure activity  LORazepam   Injectable 2 milliGRAM(s) IV Push every 2 hours PRN Agitation      Allergies    No Known Allergies    Intolerances        PHYSICAL EXAM:  Vital Signs Last 24 Hrs  T(F): 98.3 (09-24-21 @ 08:08)  HR: 98 (09-24-21 @ 08:08)  BP: 122/91 (09-24-21 @ 08:08)  RR: 19 (09-24-21 @ 08:08)    GENERAL: NAD, well-groomed, well-developed  HEAD:  Atraumatic, Normocephalic  Neuro:  Awake, alert, non-verbal  Following commands  CN: PERRL, EOMI, no nystagmus, no facial weakness, tongue protrudes in the midline  motor: no focal weakness  sensory: intact to light touch  coordination: finger to nose intact bilaterally    LABS:                        14.2   3.67  )-----------( 90       ( 24 Sep 2021 05:36 )             41.9     09-24    139  |  104  |  8   ----------------------------<  109<H>  3.2<L>   |  30  |  0.63    Ca    9.1      24 Sep 2021 05:36  Phos  2.9     09-24  Mg     2.1     09-24    TPro  7.2  /  Alb  3.7  /  TBili  1.3<H>  /  DBili  0.3<H>  /  AST  68<H>  /  ALT  47  /  AlkPhos  71  09-24          RADIOLOGY & ADDITIONAL STUDIES:  CT head 9/22/21:  1)  unremarkable CT study of the brain. No acute infarct or hemorrhagic lesion noted..  2)  inflammatory changes noted in the sinuses predominantly left-sided. Sphenoid mucosal disease and pansinus thickening also noted.    CT cervical spine 9/22/21:  No acute fracture identified.    EEG 9/23/21: normal with exception of excess beta activity  24 hour EEG 9/23/21-9/24/21: normal with exception of excess beta activity

## 2021-09-25 PROCEDURE — 99239 HOSP IP/OBS DSCHRG MGMT >30: CPT
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
## 2021-09-25 NOTE — DISCHARGE NOTE PROVIDER - NSDCCPCAREPLAN_GEN_ALL_CORE_FT
PRINCIPAL DISCHARGE DIAGNOSIS  Diagnosis: Seizure  Assessment and Plan of Treatment: Resolved. Current at baseline. Has deafness and mutism since birth. No active symptoms. No further seizure activity since admission. CT head negative. EEG negative for seizure activity. Appreciate input from Neurology. Suggested MRI brain WWO to evaluate for any structural lesion or finding that could increase risk for seizure and thus add to case for starting AED. However, patient refusing study, would not participate and was difficult to redirect. Would need to have exam under anesthesia. Since this a first time possible seizure, electing instead for patient to follow up with his PCP and Neurology outpatient. No need for AED upon discharge. Advised no driving. No operating of heavy machinery.      SECONDARY DISCHARGE DIAGNOSES  Diagnosis: Abnormal liver function test  Assessment and Plan of Treatment: Asymptomatic. Mild AST ALT elevation. Concern for possible alcohol use disorder. Patient to follow up with PCP. Patient's emergency contact / next of kin aware, shares the same PCP.

## 2021-09-25 NOTE — DISCHARGE NOTE PROVIDER - CARE PROVIDERS DIRECT ADDRESSES
,DirectAddress_Unknown,[email or web address]

## 2021-09-25 NOTE — DISCHARGE NOTE PROVIDER - CARE PROVIDER_API CALL
J Carlos Ramirez  CARDIOLOGY  [address], [address]  [place] [address]  Phone: [phone]  Fax: [phone]  Follow Up Time: 1 week    Sandrine Corona)  Clinical Neurophysiology; EEGEpilepsy; Neurology; Sleep Medicine  [address] [place] [address], [place] [address]  Phone: [phone]  Fax: [phone]  Follow Up Time: 2 weeks

## 2021-09-25 NOTE — DISCHARGE NOTE NURSING/CASE MANAGEMENT/SOCIAL WORK - PATIENT PORTAL LINK FT
You can access the FollowMyHealth Patient Portal offered by [facility] by registering at the following website: [email or web address]. By joining [employer]’s FollowMyHealth portal, you will also be able to view your health information using other applications (apps) compatible with our system.

## 2021-09-25 NOTE — DISCHARGE NOTE PROVIDER - PROVIDER TOKENS
PROVIDER:[TOKEN:[[id number]],FOLLOWUP:[1 week]],PROVIDER:[TOKEN:[[id number]],FOLLOWUP:[2 weeks]]

## 2021-09-29 DIAGNOSIS — R94.5 ABNORMAL RESULTS OF LIVER FUNCTION STUDIES: ICD-10-CM

## 2021-09-29 DIAGNOSIS — F10.10 ALCOHOL ABUSE, UNCOMPLICATED: ICD-10-CM

## 2021-09-29 DIAGNOSIS — Z56.0 UNEMPLOYMENT, UNSPECIFIED: ICD-10-CM

## 2021-09-29 DIAGNOSIS — Z20.822 CONTACT WITH AND (SUSPECTED) EXPOSURE TO COVID-19: ICD-10-CM

## 2021-09-29 DIAGNOSIS — R55 SYNCOPE AND COLLAPSE: ICD-10-CM

## 2021-09-29 DIAGNOSIS — R56.9 UNSPECIFIED CONVULSIONS: ICD-10-CM

## 2021-09-29 DIAGNOSIS — D69.6 THROMBOCYTOPENIA, UNSPECIFIED: ICD-10-CM

## 2021-09-29 DIAGNOSIS — H91.3 DEAF NONSPEAKING, NOT ELSEWHERE CLASSIFIED: ICD-10-CM

## 2021-09-29 SDOH — ECONOMIC STABILITY - INCOME SECURITY: UNEMPLOYMENT, UNSPECIFIED: Z56.0

## 2023-02-11 ENCOUNTER — EMERGENCY (EMERGENCY)
Facility: HOSPITAL | Age: 48
LOS: 1 days | Discharge: HOME | End: 2023-02-11
Attending: EMERGENCY MEDICINE | Admitting: EMERGENCY MEDICINE
Payer: MEDICAID

## 2023-02-11 VITALS
RESPIRATION RATE: 16 BRPM | TEMPERATURE: 98 F | DIASTOLIC BLOOD PRESSURE: 97 MMHG | SYSTOLIC BLOOD PRESSURE: 129 MMHG | OXYGEN SATURATION: 100 % | HEART RATE: 100 BPM

## 2023-02-11 VITALS
RESPIRATION RATE: 16 BRPM | OXYGEN SATURATION: 99 % | HEART RATE: 76 BPM | TEMPERATURE: 98 F | DIASTOLIC BLOOD PRESSURE: 89 MMHG | SYSTOLIC BLOOD PRESSURE: 121 MMHG

## 2023-02-11 PROCEDURE — 99284 EMERGENCY DEPT VISIT MOD MDM: CPT

## 2023-02-11 PROCEDURE — 70486 CT MAXILLOFACIAL W/O DYE: CPT | Mod: 26,MA

## 2023-02-11 NOTE — ED PROVIDER NOTE - PATIENT PORTAL LINK FT
You can access the FollowMyHealth Patient Portal offered by [facility] by registering at the following website: [email or web address]. By joining [employer]’s FollowMyHealth portal, you will also be able to view your health information using other applications (apps) compatible with our system. You can access the FollowMyHealth Patient Portal offered by [facility] by registering at the following website: [email or web address]. By joining [employer]’s FollowMyHealth portal, you will also be able to view your health information using other applications (apps) compatible with our system. You can access the FollowMyHealth Patient Portal offered by [facility] by registering at the following website: [email or web address]. By joining [employer]’s FollowMyHealth portal, you will also be able to view your health information using other applications (apps) compatible with our system.

## 2023-02-11 NOTE — ED ADULT NURSE NOTE - NSIMPLEMENTINTERV_GEN_ALL_ED
Implemented All Universal Safety Interventions:  [place] to call system. Call bell, personal items and telephone within reach. Instruct patient to call for assistance. Room bathroom lighting operational. Non-slip footwear when patient is off stretcher. Physically safe environment: no spills, clutter or unnecessary equipment. Stretcher in lowest position, wheels locked, appropriate side rails in place. Implemented All Universal Safety Interventions:  [place] to call system. Call bell, personal items and telephone within reach. Instruct patient to call for assistance. Room bathroom lighting operational. Non-slip footwear when patient is off stretcher. Physically safe environment: no spills, clutter or unnecessary equipment. Stretcher in lowest position, wheels locked, appropriate side rails in place. Implemented All Universal Safety Interventions:  [place] to call system. Call bell, personal items and telephone within reach. Instruct patient to call for assistance. Room bathroom lighting operational. Non-slip footwear when patient is off stretcher. Physically safe environment: no spills, clutter or unnecessary equipment. Stretcher in lowest position, wheels locked, appropriate side rails in place.
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
## 2023-02-11 NOTE — ED PROVIDER NOTE - NSFOLLOWUPCLINICS_GEN_ALL_ED_FT
[facility] [place]  Internal Medicine  [address] [place] [address]  Phone: [phone]  Fax: [phone]     [facility] [place]  Internal Medicine  [address] [place] [address]  Phone: [phone]  Fax: [phone]     [facility] [place]  Internal Medicine  [address] [place] [address]  Phone: [phone]  Fax: [phone]
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
## 2023-02-11 NOTE — ED PROVIDER NOTE - PHYSICAL EXAMINATION
PHYSICAL EXAM:    GENERAL: NAD  HEENT:  small laceration on right side of nose, not bleeding   CHEST/LUNG: Chest rise equal bilaterally  HEART: Regular rate and rhythm  ABDOMEN: Soft, Nontender, Nondistended  EXTREMITIES:  Extremities warm  PSYCH: alert and awake   SKIN: small laceration on right side of nose, not bleeding   NEURO: grossly intact

## 2025-02-14 ENCOUNTER — EMERGENCY (EMERGENCY)
Facility: HOSPITAL | Age: 50
LOS: 1 days | Discharge: HOME | End: 2025-02-14
Attending: STUDENT IN AN ORGANIZED HEALTH CARE EDUCATION/TRAINING PROGRAM
Payer: MEDICAID

## 2025-02-14 VITALS
OXYGEN SATURATION: 100 % | SYSTOLIC BLOOD PRESSURE: 122 MMHG | RESPIRATION RATE: 16 BRPM | HEART RATE: 100 BPM | DIASTOLIC BLOOD PRESSURE: 86 MMHG | TEMPERATURE: 98 F | WEIGHT: 139.99 LBS

## 2025-02-14 VITALS
TEMPERATURE: 98 F | RESPIRATION RATE: 18 BRPM | SYSTOLIC BLOOD PRESSURE: 135 MMHG | HEART RATE: 95 BPM | OXYGEN SATURATION: 100 % | DIASTOLIC BLOOD PRESSURE: 91 MMHG

## 2025-02-14 LAB
ALBUMIN SERPL ELPH-MCNC: 4.8 G/DL — SIGNIFICANT CHANGE UP (ref 3.3–5)
ALP SERPL-CCNC: 82 U/L — SIGNIFICANT CHANGE UP (ref 40–120)
ALT FLD-CCNC: 69 U/L — HIGH (ref 10–45)
ANION GAP SERPL CALC-SCNC: 17 MMOL/L — SIGNIFICANT CHANGE UP (ref 5–17)
APTT BLD: 31.2 SEC — SIGNIFICANT CHANGE UP (ref 24.5–35.6)
AST SERPL-CCNC: 163 U/L — HIGH (ref 10–40)
BASOPHILS # BLD AUTO: 0.01 K/UL — SIGNIFICANT CHANGE UP (ref 0–0.2)
BASOPHILS NFR BLD AUTO: 0.2 % — SIGNIFICANT CHANGE UP (ref 0–2)
BILIRUB SERPL-MCNC: 1 MG/DL — SIGNIFICANT CHANGE UP (ref 0.2–1.2)
BUN SERPL-MCNC: 17 MG/DL — SIGNIFICANT CHANGE UP (ref 7–23)
CALCIUM SERPL-MCNC: 10.3 MG/DL — SIGNIFICANT CHANGE UP (ref 8.4–10.5)
CHLORIDE SERPL-SCNC: 91 MMOL/L — LOW (ref 96–108)
CO2 SERPL-SCNC: 25 MMOL/L — SIGNIFICANT CHANGE UP (ref 22–31)
CREAT SERPL-MCNC: 0.69 MG/DL — SIGNIFICANT CHANGE UP (ref 0.5–1.3)
EGFR: 115 ML/MIN/1.73M2 — SIGNIFICANT CHANGE UP
EOSINOPHIL # BLD AUTO: 0.08 K/UL — SIGNIFICANT CHANGE UP (ref 0–0.5)
EOSINOPHIL NFR BLD AUTO: 1.4 % — SIGNIFICANT CHANGE UP (ref 0–6)
GLUCOSE SERPL-MCNC: 71 MG/DL — SIGNIFICANT CHANGE UP (ref 70–99)
HCT VFR BLD CALC: 40 % — SIGNIFICANT CHANGE UP (ref 39–50)
HGB BLD-MCNC: 14 G/DL — SIGNIFICANT CHANGE UP (ref 13–17)
IMM GRANULOCYTES NFR BLD AUTO: 0.2 % — SIGNIFICANT CHANGE UP (ref 0–0.9)
INR BLD: 0.89 RATIO — SIGNIFICANT CHANGE UP (ref 0.85–1.16)
LYMPHOCYTES # BLD AUTO: 2.1 K/UL — SIGNIFICANT CHANGE UP (ref 1–3.3)
LYMPHOCYTES # BLD AUTO: 35.7 % — SIGNIFICANT CHANGE UP (ref 13–44)
MCHC RBC-ENTMCNC: 32.8 PG — SIGNIFICANT CHANGE UP (ref 27–34)
MCHC RBC-ENTMCNC: 35 G/DL — SIGNIFICANT CHANGE UP (ref 32–36)
MCV RBC AUTO: 93.7 FL — SIGNIFICANT CHANGE UP (ref 80–100)
MONOCYTES # BLD AUTO: 0.93 K/UL — HIGH (ref 0–0.9)
MONOCYTES NFR BLD AUTO: 15.8 % — HIGH (ref 2–14)
NEUTROPHILS # BLD AUTO: 2.75 K/UL — SIGNIFICANT CHANGE UP (ref 1.8–7.4)
NEUTROPHILS NFR BLD AUTO: 46.7 % — SIGNIFICANT CHANGE UP (ref 43–77)
NRBC BLD AUTO-RTO: 0 /100 WBCS — SIGNIFICANT CHANGE UP (ref 0–0)
PLATELET # BLD AUTO: 97 K/UL — LOW (ref 150–400)
POTASSIUM SERPL-MCNC: 3 MMOL/L — LOW (ref 3.5–5.3)
POTASSIUM SERPL-SCNC: 3 MMOL/L — LOW (ref 3.5–5.3)
PROT SERPL-MCNC: 8.2 G/DL — SIGNIFICANT CHANGE UP (ref 6–8.3)
PROTHROM AB SERPL-ACNC: 10.2 SEC — SIGNIFICANT CHANGE UP (ref 9.9–13.4)
RBC # BLD: 4.27 M/UL — SIGNIFICANT CHANGE UP (ref 4.2–5.8)
RBC # FLD: 12.7 % — SIGNIFICANT CHANGE UP (ref 10.3–14.5)
SODIUM SERPL-SCNC: 133 MMOL/L — LOW (ref 135–145)
WBC # BLD: 5.88 K/UL — SIGNIFICANT CHANGE UP (ref 3.8–10.5)
WBC # FLD AUTO: 5.88 K/UL — SIGNIFICANT CHANGE UP (ref 3.8–10.5)

## 2025-02-14 RX ORDER — POTASSIUM CHLORIDE 750 MG/1
40 TABLET, EXTENDED RELEASE ORAL ONCE
Refills: 0 | Status: COMPLETED | OUTPATIENT
Start: 2025-02-14 | End: 2025-02-14

## 2025-02-14 RX ORDER — POTASSIUM CHLORIDE 750 MG/1
10 TABLET, EXTENDED RELEASE ORAL ONCE
Refills: 0 | Status: DISCONTINUED | OUTPATIENT
Start: 2025-02-14 | End: 2025-02-18

## 2025-02-14 RX ADMIN — POTASSIUM CHLORIDE 40 MILLIEQUIVALENT(S): 750 TABLET, EXTENDED RELEASE ORAL at 15:21

## 2025-02-14 NOTE — ED PROVIDER NOTE - PHYSICAL EXAMINATION
PHYSICAL EXAM:  GENERAL: NAD, well-developed  HEAD:  Atraumatic, Normocephalic  NECK: Supple, No JVD  CHEST/LUNG: Clear to auscultation bilaterally; No wheeze  HEART: Regular rate and rhythm; No murmurs, rubs, or gallops  ABDOMEN: Soft, Nontender, Nondistended; Bowel sounds present  EXTREMITIES:  2+ Peripheral Pulses, No clubbing, cyanosis, or edema  PSYCH: AAOx3  NEUROLOGY: non-focal. Strength 5/5 b/l throughout. No dysdiadochokinesia, sensation intact. Normal gait.  SKIN: No rashes or lesions

## 2025-02-14 NOTE — ED PROVIDER NOTE - PROGRESS NOTE DETAILS
some electrolyte abnormalities including some hypokalemia, will replete the potassium, AST greater than ALT, patient  family endorses drinking, counseled to reduce use and not to drink around bedtime in order to help with insomnia symptoms

## 2025-02-14 NOTE — ED PROVIDER NOTE - NSFOLLOWUPINSTRUCTIONS_ED_ALL_ED_FT
- You were seen in the emergency department today for  trouble sleeping.  You likely have insomnia which can be managed with good sleep hygiene as detailed in the packet provided to you.    - Lab and imaging results, if performed, were discussed with you along with your discharge diagnosis.   Some of your liver enzymes were elevated and some of your electrolytes were low, you are counseled to reduce your alcohol intake, and follow proper sleep hygiene.    - Follow up with your doctor in 1 week - bring copies of your results if you were given. If you are supposed  to follow up with a specialist, please bring your results with you as well.     - Return to the ED for any new, worsening, or concerning symptoms to you    - Continue all prescribed medications.    - Take acetaminophen as directed as needed for pain.     - Rest and keep yourself hydrated with fluids.

## 2025-02-14 NOTE — ED ADULT NURSE NOTE - CHIEF COMPLAINT QUOTE
patient "is mute" as per patients friend with him, patient is not deaf as per patient's visitor, presenting to the ER due to not sleeping/not eating for 1-2 weeks,   patient nodding yes or no to RN questions in triage  unknown medical history patient visitor states that a family member of the patient is coming and will be able to provide more information.

## 2025-02-14 NOTE — ED ADULT TRIAGE NOTE - CHIEF COMPLAINT QUOTE
patient "is mute" as per patients friend with him, patient is not deaf as per patient's visitor, not sleeping, not eating for 1-2 weeks, patient nodding yes or no to RN questions in triage patient "is mute" as per patients friend with him, patient is not deaf as per patient's visitor, presenting to the ER due to not sleeping/not eating for 1-2 weeks,   patient nodding yes or no to RN questions in triage patient "is mute" as per patients friend with him, patient is not deaf as per patient's visitor, presenting to the ER due to not sleeping/not eating for 1-2 weeks,   patient nodding yes or no to RN questions in triage  unknown medical history patient visitor states that a family member of the patient is coming and will be able to provide more information.

## 2025-02-14 NOTE — ED PROVIDER NOTE - PATIENT PORTAL LINK FT
You can access the FollowMyHealth Patient Portal offered by [facility] by registering at the following website: [email or web address]. By joining [facility]’s FollowMyHealth portal, you will also be able to view your health information using other applications (apps) compatible with our system.

## 2025-02-14 NOTE — ED PROVIDER NOTE - OBJECTIVE STATEMENT
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 As per family at bedside he has been having difficulty sleeping for 2 weeks. Endorses that he walks around at night and paces back and forth and seems weaker than usual. Denies any other symptoms, denies any stress, denies any intermittent snoring episodes, denies excessive daytime sleepiness or sleeping during daytime. Denies any medical or psychiatric history.

## 2025-02-14 NOTE — ED PROVIDER NOTE - CLINICAL SUMMARY MEDICAL DECISION MAKING FREE TEXT BOX
48yo M with insomnia. Unclear as to the exact cause; patient appears normal here and without any complaints. Will check labwork to ensure no liver or kidney dysfunction as possible causes. If normal; will d/c with recommendations for sleep hygiene.

## 2025-02-14 NOTE — ED ADULT NURSE NOTE - OBJECTIVE STATEMENT
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 As per family at bedside he has been having difficulty sleeping for 2 weeks. Endorses that he walks around at night and paces back and forth and seems weaker than usual. Denies any other symptoms, denies any stress, denies any intermittent snoring episodes, denies excessive daytime sleepiness or sleeping during daytime. Denies any medical or psychiatric history.IV placed and labs sent .  Family deven upstairs ti his MD appointment will return [facility]